# Patient Record
Sex: MALE | Race: WHITE | NOT HISPANIC OR LATINO | Employment: UNEMPLOYED | URBAN - METROPOLITAN AREA
[De-identification: names, ages, dates, MRNs, and addresses within clinical notes are randomized per-mention and may not be internally consistent; named-entity substitution may affect disease eponyms.]

---
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
## 2025-01-20 NOTE — TELEPHONE ENCOUNTER
----- Message from Geni Barrett sent at 2/26/2018 10:37 AM CST -----  Contact: [phone] Mom   Refill request for Concerta 54 MG CR. Please send to the [facility] on  [address] [id number]. Thank you.  
Left message concerning request for refill, patient in needed of office visit.  
4